# Patient Record
(demographics unavailable — no encounter records)

---

## 2025-07-08 NOTE — HISTORY OF PRESENT ILLNESS
[FreeTextEntry1] : Love Hendricks is an 83y/o woman with Hx of HTN, HLD, CVA, and sinus node dysfunction s/p PPM placement who presents today for routine f/u. On device check last week, concern for RV lead migration. Chest Xray performed and confirmed. She feels well. Denies chest pain, palpitations, SOB, syncope or near syncope.

## 2025-07-08 NOTE — HISTORY OF PRESENT ILLNESS
[FreeTextEntry1] : Love Hendricks is an 81y/o woman with Hx of HTN, HLD, CVA, and sinus node dysfunction s/p PPM placement who presents today for routine f/u. On device check last week, concern for RV lead migration. Chest Xray performed and confirmed. She feels well. Denies chest pain, palpitations, SOB, syncope or near syncope.

## 2025-07-08 NOTE — REASON FOR VISIT
[Arrhythmia/ECG Abnorrmalities] : arrhythmia/ECG abnormalities [FreeTextEntry3] : Stephan Rhodes MD

## 2025-07-08 NOTE — DISCUSSION/SUMMARY
[EKG obtained to assist in diagnosis and management of assessed problem(s)] : EKG obtained to assist in diagnosis and management of assessed problem(s) [FreeTextEntry1] : Impression:  1. Sinus node dysfunction: s/p dual chamber PPM placement. EKG performed today to assess for presence of conduction disease and reveals NSR. Review of Chest Xray from last week confirmed RV lead has migrated into RA. Discussed possible lead extraction/replacement vs reprogramming, reprogramming preferred. Device reprogrammed to AAI 70. Resume routine device checks as scheduled.   2. HTN: resume oral antihypertensives as prescribed. Encouraged heart healthy diet, sodium restriction, and weight loss. Continue regular f/u with Cardiologist for further HTN management.  3. HLD: resume statin therapy as prescribed and regular f/u with Cardiologist for routine lipid monitoring and management.  Will continue f/u with Cardiologist and may RTO as needed or if any new or worsening symptoms or findings occur.